# Patient Record
Sex: MALE | Race: WHITE | NOT HISPANIC OR LATINO | URBAN - METROPOLITAN AREA
[De-identification: names, ages, dates, MRNs, and addresses within clinical notes are randomized per-mention and may not be internally consistent; named-entity substitution may affect disease eponyms.]

---

## 2017-04-12 ENCOUNTER — OFFICE (OUTPATIENT)
Dept: URBAN - METROPOLITAN AREA CLINIC 11 | Facility: CLINIC | Age: 32
End: 2017-04-12

## 2017-04-12 VITALS
DIASTOLIC BLOOD PRESSURE: 74 MMHG | WEIGHT: 182 LBS | SYSTOLIC BLOOD PRESSURE: 128 MMHG | HEIGHT: 71 IN | HEART RATE: 58 BPM | RESPIRATION RATE: 16 BRPM

## 2017-04-12 DIAGNOSIS — K62.5 HEMORRHAGE OF ANUS AND RECTUM: ICD-10-CM

## 2017-04-12 DIAGNOSIS — K62.89 OTHER SPECIFIED DISEASES OF ANUS AND RECTUM: ICD-10-CM

## 2017-04-12 DIAGNOSIS — R14.0 ABDOMINAL DISTENSION (GASEOUS): ICD-10-CM

## 2017-04-12 DIAGNOSIS — K60.2 ANAL FISSURE, UNSPECIFIED: ICD-10-CM

## 2017-04-12 DIAGNOSIS — E66.3 OVERWEIGHT: ICD-10-CM

## 2017-04-12 PROCEDURE — 99203 OFFICE O/P NEW LOW 30 MIN: CPT | Performed by: INTERNAL MEDICINE

## 2017-04-12 NOTE — SERVICEHPINOTES
Mr. Jack is a 31-year-old man here for evaluation of intermittent painful rectal bleeding. The patient states that his symptoms have gone for several months and that he will have an occasional episode of some bright red blood on the toilet paper with bowel movements. He states that he normally has 1-2 bowel movements a day which are normal and that he will occasionally have to strain. He states that sometimes he can be aggressive with wiping. He states that about once a week or so he will have the red blood with a bowel movement. He does not have blood by itself. He is never had a colonoscopy. There is no first-degree family history of colon cancer or colon polyps. He denies any fevers, chills, nausea, vomiting, or weight loss. He did have diarrhea today but thinks that he may have caught a bug as he normally does not have diarrhea. He has had some bloating as well. He denies any trauma to his anal area.

## 2017-04-12 NOTE — SERVICENOTES
The patient appears to have an anal fissure in the anterior area.  We had a long discussion regarding treatment of this including need for stool softeners, sitz baths, and treatment with nitroglycerin ointment.  We did also discuss the differential of there being something else causing the bleeding such as a polyp, perianal fistula, neoplasm, etc.  We did discuss the possibility of performing colonoscopy or flexible sigmoidoscopy, however the patient states that he would prefer to hold off on any endoscopic evaluation at this time but would reconsider if symptoms are not better in the next few weeks.  He should notify us if this is the case.